# Patient Record
Sex: MALE | URBAN - METROPOLITAN AREA
[De-identification: names, ages, dates, MRNs, and addresses within clinical notes are randomized per-mention and may not be internally consistent; named-entity substitution may affect disease eponyms.]

---

## 2020-08-05 ENCOUNTER — TELEPHONE ENCOUNTER (OUTPATIENT)
Dept: URBAN - METROPOLITAN AREA CLINIC 25 | Facility: CLINIC | Age: 30
End: 2020-08-05

## 2020-08-05 ENCOUNTER — OFFICE VISIT (OUTPATIENT)
Dept: URBAN - METROPOLITAN AREA TELEHEALTH 2 | Facility: TELEHEALTH | Age: 30
End: 2020-08-05
Payer: COMMERCIAL

## 2020-08-05 DIAGNOSIS — K58.8 OTHER IRRITABLE BOWEL SYNDROME: ICD-10-CM

## 2020-08-05 DIAGNOSIS — E73.9 LACTOSE INTOLERANCE: ICD-10-CM

## 2020-08-05 DIAGNOSIS — R14.0 BLOATING AND GAS: ICD-10-CM

## 2020-08-05 PROCEDURE — G9903 PT SCRN TBCO ID AS NON USER: HCPCS | Performed by: INTERNAL MEDICINE

## 2020-08-05 PROCEDURE — 99214 OFFICE O/P EST MOD 30 MIN: CPT | Performed by: INTERNAL MEDICINE

## 2020-08-05 PROCEDURE — G8427 DOCREV CUR MEDS BY ELIG CLIN: HCPCS | Performed by: INTERNAL MEDICINE

## 2020-08-05 PROCEDURE — 1036F TOBACCO NON-USER: CPT | Performed by: INTERNAL MEDICINE

## 2020-08-05 PROCEDURE — G8417 CALC BMI ABV UP PARAM F/U: HCPCS | Performed by: INTERNAL MEDICINE

## 2020-08-05 RX ORDER — RIFAXIMIN 550 MG/1
1 TABLET TABLET ORAL THREE TIMES A DAY
Qty: 42 TABLET | Refills: 0 | OUTPATIENT
Start: 2020-08-05

## 2020-08-05 RX ORDER — DICYCLOMINE HYDROCHLORIDE 20 MG/1
1 TABLET TABLET ORAL THREE TIMES A DAY
Qty: 270 TABLET | Refills: 2 | OUTPATIENT
Start: 2020-08-05

## 2020-08-05 NOTE — HPI-TODAY'S VISIT:
AUG 2020 : More diarrhea. takes lactaid prn. chronic IBS issues. 2-3 BM per day. semiformed stools. increased freq. bloating. improved by avoiding certain foods.   last seen in jan 2020. Dr Villarreal.   The patient is a 29 year old male with PMH of IBS and lactose intolerance, who presents on self referral for a gastroenterology evaluation for above issues. irritable bowel syndrome .  Currently, the patient has complaints of bloating, gas pains, diarrhea, and abdominal pain.  The patient reports that the symptoms are complicated by dairy products and fatty foods and are relieved by bowel movements. The patient has tried prescription medications. The prescription medication(s) of Hyoscamine oral has been successful in resolving the symptoms.      Pt previously followed by Dr. Hermes Mendes in [place]. He underwent cscope in 2016, which was normal. He was ultimatley diagnosed with IBS and lactose intolerance. He has taken Levsin in past for his symptoms which seemed to help. He has not taken anything in the past 2 years. He currently reports intermittent abdominal cramping, diarrhea, gas and bloating. Denies any heartburn, acid reflux, N/V, melena or hematochezia. 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

## 2020-09-23 ENCOUNTER — OFFICE VISIT (OUTPATIENT)
Dept: URBAN - METROPOLITAN AREA TELEHEALTH 2 | Facility: TELEHEALTH | Age: 30
End: 2020-09-23

## 2020-09-23 ENCOUNTER — DASHBOARD ENCOUNTERS (OUTPATIENT)
Age: 30
End: 2020-09-23

## 2020-09-23 RX ORDER — DICYCLOMINE HYDROCHLORIDE 20 MG/1
1 TABLET TABLET ORAL THREE TIMES A DAY
Qty: 270 TABLET | Refills: 2 | OUTPATIENT

## 2020-09-23 RX ORDER — DICYCLOMINE HYDROCHLORIDE 20 MG/1
1 TABLET TABLET ORAL THREE TIMES A DAY
Qty: 270 TABLET | Refills: 2 | Status: ACTIVE | COMMUNITY
Start: 2020-08-05

## 2020-09-23 RX ORDER — RIFAXIMIN 550 MG/1
1 TABLET TABLET ORAL THREE TIMES A DAY
Qty: 42 TABLET | Refills: 0 | OUTPATIENT

## 2020-09-23 RX ORDER — RIFAXIMIN 550 MG/1
1 TABLET TABLET ORAL THREE TIMES A DAY
Qty: 42 TABLET | Refills: 0 | Status: ACTIVE | COMMUNITY
Start: 2020-08-05

## 2020-09-23 NOTE — HPI-OTHER HISTORIES
AUG 2020 : More diarrhea. takes lactaid prn. chronic IBS issues. 2-3 BM per day. semiformed stools. increased freq. bloating. improved by avoiding certain foods.   last seen in jan 2020. Dr Villarreal.   The patient is a 29 year old male with PMH of IBS and lactose intolerance, who presents on self referral for a gastroenterology evaluation for above issues. irritable bowel syndrome .  Currently, the patient has complaints of bloating, gas pains, diarrhea, and abdominal pain.  The patient reports that the symptoms are complicated by dairy products and fatty foods and are relieved by bowel movements. The patient has tried prescription medications. The prescription medication(s) of Hyoscamine oral has been successful in resolving the symptoms.      Pt previously followed by Dr. Hermes Mendes in [place]. He underwent cscope in 2016, which was normal. He was ultimatley diagnosed with IBS and lactose intolerance. He has taken Levsin in past for his symptoms which seemed to help. He has not taken anything in the past 2 years. He currently reports intermittent abdominal cramping, diarrhea, gas and bloating. Denies any heartburn, acid reflux, N/V, melena or hematochezia. 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